# Patient Record
Sex: FEMALE | Race: WHITE
[De-identification: names, ages, dates, MRNs, and addresses within clinical notes are randomized per-mention and may not be internally consistent; named-entity substitution may affect disease eponyms.]

---

## 2018-04-25 ENCOUNTER — APPOINTMENT (OUTPATIENT)
Dept: WOUND CARE | Facility: CLINIC | Age: 83
End: 2018-04-25

## 2020-09-09 ENCOUNTER — APPOINTMENT (OUTPATIENT)
Dept: GERIATRICS | Facility: CLINIC | Age: 85
End: 2020-09-09
Payer: MEDICARE

## 2020-09-09 VITALS — HEART RATE: 72 BPM | OXYGEN SATURATION: 97 % | RESPIRATION RATE: 14 BRPM

## 2020-09-09 DIAGNOSIS — I73.9 PERIPHERAL VASCULAR DISEASE, UNSPECIFIED: ICD-10-CM

## 2020-09-09 DIAGNOSIS — G47.00 INSOMNIA, UNSPECIFIED: ICD-10-CM

## 2020-09-09 DIAGNOSIS — I50.9 HEART FAILURE, UNSPECIFIED: ICD-10-CM

## 2020-09-09 DIAGNOSIS — Z87.81 PERSONAL HISTORY OF (HEALED) TRAUMATIC FRACTURE: ICD-10-CM

## 2020-09-09 DIAGNOSIS — Z87.898 PERSONAL HISTORY OF OTHER SPECIFIED CONDITIONS: ICD-10-CM

## 2020-09-09 DIAGNOSIS — I10 ESSENTIAL (PRIMARY) HYPERTENSION: ICD-10-CM

## 2020-09-09 DIAGNOSIS — Z87.01 PERSONAL HISTORY OF PNEUMONIA (RECURRENT): ICD-10-CM

## 2020-09-09 DIAGNOSIS — R41.89 OTHER SYMPTOMS AND SIGNS INVOLVING COGNITIVE FUNCTIONS AND AWARENESS: ICD-10-CM

## 2020-09-09 DIAGNOSIS — Z82.3 FAMILY HISTORY OF STROKE: ICD-10-CM

## 2020-09-09 DIAGNOSIS — I48.0 PAROXYSMAL ATRIAL FIBRILLATION: ICD-10-CM

## 2020-09-09 PROCEDURE — 99205 OFFICE O/P NEW HI 60 MIN: CPT | Mod: 95

## 2020-09-09 RX ORDER — ZOLPIDEM TARTRATE 5 MG/1
5 TABLET ORAL
Refills: 0 | Status: ACTIVE | COMMUNITY

## 2020-09-09 RX ORDER — LOSARTAN POTASSIUM 100 MG/1
100 TABLET, FILM COATED ORAL
Refills: 0 | Status: ACTIVE | COMMUNITY

## 2020-09-09 RX ORDER — MULTIVIT-MIN/FA/LYCOPEN/LUTEIN .4-300-25
TABLET ORAL
Refills: 0 | Status: ACTIVE | COMMUNITY

## 2020-09-09 RX ORDER — APIXABAN 2.5 MG/1
2.5 TABLET, FILM COATED ORAL
Refills: 0 | Status: ACTIVE | COMMUNITY

## 2020-09-09 RX ORDER — NIFEDIPINE 30 MG/1
30 TABLET, EXTENDED RELEASE ORAL DAILY
Refills: 0 | Status: ACTIVE | COMMUNITY

## 2020-09-09 RX ORDER — ASPIRIN ENTERIC COATED TABLETS 81 MG 81 MG/1
81 TABLET, DELAYED RELEASE ORAL
Refills: 0 | Status: ACTIVE | COMMUNITY

## 2020-09-09 RX ORDER — SPIRONOLACTONE 25 MG/1
25 TABLET, FILM COATED ORAL DAILY
Qty: 90 | Refills: 1 | Status: ACTIVE | COMMUNITY

## 2020-09-09 NOTE — DATA REVIEWED
[FreeTextEntry1] : From info provided by radha (labs from [place] 5/2020):\par \par WBC 6.94, Hgb 11.8, Plt 201\par Na 134, K 4.4, BUN 33, Cr 1.55

## 2020-09-09 NOTE — HISTORY OF PRESENT ILLNESS
[Medical Office: ([facility])___] : at the medical office located in  [Home] : at home, [unfilled] , at the time of the visit. [Family Member] : family member [Verbal consent obtained from patient] : the patient, [unfilled] [0] : 2) Feeling down, depressed, or hopeless: Not at all [FreeTextEntry1] : 89 year old woman w/ PMH CHF, PAD s/p LLE stents (2019/2020), AFib (on eliquis), HTN, insomnia, gait abnormality and presbycusis presents for initial visit w/ cognitive decline w/ plan to establish care with new PCP.\par \par Prior PCP: Dr. Stanislaw Nguyễn\par \par Patient had some difficulty hearing me via televisit due to hearing aide dysfunction (daughters stated she had an appt w/ audiology later today to have them repaired). History was also provided by daughters.\par \par Patient was reportedly functionally independent in all ADLs and IADLs last year until a fall January 2020. 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 Records not available for review at today's visit, but given verbal hx by daughters. They report she had significant decline in functional and cognitive capability post-hospitalization and has not recovered to baseline. Was in [place] until last month (for past 7 months). She now lives in [facility] w/ 24/7 [facility]. She is dependent on all IADLs, relatively independent in ADLs. Currently ambulates w/ walker (previously independent or with cane). \par \par She reportedly wakes up in the middle of the night and will try to wander around the house. She is not agitated, but can be "resistant to taking meds" and "stubborn" at times. She often forgets things one hour after hearing them. \par \par CHF: Unclear ?systolic vs. diastolic. Currently on aldactone, losartan w/o lasix (?due to prior hyponatremia). Last K was 4.4 from 5/2020 in [place]. Also on nifedipine 30 mg qd. Family reports she is euvolemic, no hypoxia or dyspnea.\par \par AFib: Rate controlled. On eliquis.\par \par PAD: Prior stents in the last year. On ASA, no plavix (but eliquis). \par \par Insomnia: Family reports she has been on ambien for >20 years (5 mg qhs). More recent sleep/wake cycle disturbance in the past few months a/w cognitive decline. \par \par HCM: RECORDS NEED TO BE OBTAINED FROM PRIOR PCP, family is unsure\par DEXA:\par Audiology: has appt today\par Ophthalmology: \par Colonoscopy: n/a\par \par Immunizations:\par Pneumovax:\par Tdap:\par Flu:\par Shingrix:

## 2020-09-09 NOTE — CURRENT MEDS
[Reports Changes In Medication (including OTC)] : Patient reports changes in medication [High Risk Medications Reviewed] : High risk medications reviewed [Medication Reconciliation Completed] : Medication reconciliation completed [Patient/caregiver able to verbalize understanding of medications, including indications and side effects] : Patient/caregiver able to verbalize understanding of medications, including indications and side effects [] : does not miss any medication doses

## 2020-09-09 NOTE — REVIEW OF SYSTEMS
[Loss Of Hearing] : hearing loss [Confused] : confusion [Sleep Disturbances] : sleep disturbances [As Noted in HPI] : as noted in HPI [Negative] : Heme/Lymph [Dizziness] : no dizziness [Fainting] : no fainting

## 2020-09-09 NOTE — ASSESSMENT
[Living arrangements] : living arrangements [Social support] : social support [Medication Management] : medication management [Discussed at today's visit] : Advance Directives Discussed at today's visit [FreeTextEntry1] : Needs in-person eval ASAP in the office, next 1-2 weeks. Ordered labs. Need old records.  [FreeTextEntry4] : ?Federica and Ivett are co-HCP. Unclear if previously completed. There are 4 total children (one in [facility], one in [place], two in ).

## 2020-09-09 NOTE — PHYSICAL EXAM
[General Appearance - Alert] : alert [General Appearance - Well Nourished] : well nourished [General Appearance - In No Acute Distress] : in no acute distress [General Appearance - Well-Appearing] : healthy appearing [General Appearance - Well Developed] : well developed [] : no respiratory distress [Respiration, Rhythm And Depth] : normal respiratory rhythm and effort [Edema] : there was no peripheral edema [FreeTextEntry1] : difficulty hearing

## 2020-09-09 NOTE — SOCIAL HISTORY
[Any fall with injury in past year] : Patient reported fall with injury in the past year [Independent] : feeding [Some assistance needed] : transferring [Full assistance needed] : managing finances [Walker] : walker [FreeTextEntry2] : 24/7 [facility] [FreeTextEntry1] : SHAUN (Wendy Whitehead)  [FreeTextEntry4] : TBD

## 2021-10-06 PROBLEM — I10 ESSENTIAL HYPERTENSION: Status: ACTIVE | Noted: 2020-09-09

## 2024-06-16 ENCOUNTER — NON-APPOINTMENT (OUTPATIENT)
Age: 89
End: 2024-06-16

## 2024-06-18 ENCOUNTER — LABORATORY RESULT (OUTPATIENT)
Age: 89
End: 2024-06-18

## 2024-06-18 ENCOUNTER — APPOINTMENT (OUTPATIENT)
Dept: DERMATOLOGY | Facility: CLINIC | Age: 89
End: 2024-06-18
Payer: MEDICARE

## 2024-06-18 DIAGNOSIS — R23.8 OTHER SKIN CHANGES: ICD-10-CM

## 2024-06-18 DIAGNOSIS — D48.5 NEOPLASM OF UNCERTAIN BEHAVIOR OF SKIN: ICD-10-CM

## 2024-06-18 DIAGNOSIS — L57.0 ACTINIC KERATOSIS: ICD-10-CM

## 2024-06-18 DIAGNOSIS — L82.0 INFLAMED SEBORRHEIC KERATOSIS: ICD-10-CM

## 2024-06-18 PROCEDURE — 17110 DESTRUCTION B9 LES UP TO 14: CPT

## 2024-06-18 PROCEDURE — 11102 TANGNTL BX SKIN SINGLE LES: CPT | Mod: 59

## 2024-06-18 PROCEDURE — 17000 DESTRUCT PREMALG LESION: CPT | Mod: 59

## 2024-06-19 PROBLEM — L82.0 INFLAMED SEBORRHEIC KERATOSIS: Status: ACTIVE | Noted: 2024-06-19

## 2024-06-19 PROBLEM — D48.5 NEOPLASM OF UNCERTAIN BEHAVIOR OF SKIN: Status: ACTIVE | Noted: 2024-06-19

## 2024-06-19 PROBLEM — L57.0 ACTINIC KERATOSIS: Status: ACTIVE | Noted: 2024-06-19

## 2024-06-19 PROBLEM — R23.8 PAPULE OF SKIN: Status: ACTIVE | Noted: 2024-06-19

## 2024-06-20 NOTE — HISTORY OF PRESENT ILLNESS
[FreeTextEntry1] : np growth [de-identified] : Referred by: Dr. Stanislaw Wu   Ms. KAI JAY  is a 89 year old F here for evaluation of below  #growth on scalp x unknown duration, thickening #growth on L temple that she manipulates #growth on L side of nose x 2 yrs, stable  Personal hx of skin cancer: R chin 20 yrs ago ? skin cancer , resolved w/ cream Social Hx: Here with her daughter who lives in [facility] , and her aide

## 2024-06-20 NOTE — ASSESSMENT
[FreeTextEntry1] : #NUBS, vertex r/o HAK vs NMSC Biopsy by Shave The risks/benefits/alternatives of skin biopsy were explained to the patient, which include and are not limited to bleeding, infection, scarring or discoloration of skin, and recurrence of lesion. Patient expressed understanding of these risks and provided consent to the procedure. Time out with verification of patient and lesion site was performed. Site was prepped with rubbing alcohol, lidocaine with epinephrine was injected for anesthesia, and biopsy was performed. Specimen sent to path. Procedure was without complication and well tolerated. Wound care was discussed.  HAK vertex scalp We have discussed the nature and usual course of these lesions. After obtaining verbal consent, 1 lesions were treated with liquid nitrogen for several seconds, with a ~10 second thaw interval for a total of 2 cycles. The patient was informed of the potential for erythema, blister formation, hypo- or hyperpigmentation, and scar at the site of treatment. wound care discussed - vaseline for any irritation/soreness/blistering. The patient tolerated the treatment well and knows to return if the lesions do not resolve.  iSK, L forehead We have discussed the nature and usual course of these lesions. After obtaining verbal consent, 1 lesions were treated with liquid nitrogen for several seconds, with a ~10 second thaw interval for a total of 2 cycles. The patient was informed of the potential for erythema, blister formation, hypo- or hyperpigmentation, and scar at the site of treatment. wound care discussed - vaseline for any irritation/soreness/blistering. The patient tolerated the treatment well and knows to return if the lesions do not resolve.  papule of skin, , L nasal alar rim ddx angiofibroma vs nf vs unlikely idn  reportedly stable over time cont to monitor advised daughter/aide to RTC if any growth/change   call w/ results jamaica (daughter) [phone] som (daughter) [phone]

## 2024-06-20 NOTE — PHYSICAL EXAM
[Alert] : alert [Oriented x 3] : ~L oriented x 3 [Well Nourished] : well nourished [Conjunctiva Non-injected] : conjunctiva non-injected [No Visual Lymphadenopathy] : no visual  lymphadenopathy [No Clubbing] : no clubbing [No Edema] : no edema [No Bromhidrosis] : no bromhidrosis [No Chromhidrosis] : no chromhidrosis [FreeTextEntry3] : 0.5 cm pink plaque with a hypkeratotic horn on L vertex adjacent hyperkeratotic pink plaque on scalp vertex L forehead with verrucous papule smooth skin colored papule on L nasal ala

## 2024-06-20 NOTE — CONSULT LETTER
[Dear  ___] : Dear  [unfilled], [Consult Letter:] : I had the pleasure of evaluating your patient, [unfilled]. [Please see my note below.] : Please see my note below. [Consult Closing:] : Thank you very much for allowing me to participate in the care of this patient.  If you have any questions, please do not hesitate to contact me. [Sincerely,] : Sincerely, [FreeTextEntry3] : Amparo Mosquera MD Department of Dermatology [place] and [facility] at [facility]

## 2024-07-02 ENCOUNTER — NON-APPOINTMENT (OUTPATIENT)
Age: 89
End: 2024-07-02

## 2024-08-06 ENCOUNTER — LABORATORY RESULT (OUTPATIENT)
Age: 89
End: 2024-08-06

## 2024-08-07 ENCOUNTER — APPOINTMENT (OUTPATIENT)
Dept: DERMATOLOGY | Facility: CLINIC | Age: 89
End: 2024-08-07

## 2024-08-07 PROBLEM — D04.4 SQUAMOUS CELL CARCINOMA IN SITU (SCCIS) OF SCALP: Status: ACTIVE | Noted: 2024-08-07

## 2024-08-07 PROBLEM — D48.5: Status: ACTIVE | Noted: 2024-08-07

## 2024-08-07 PROCEDURE — 17270 DSTR MAL LES S/N/H/F/G .5 /<: CPT

## 2024-08-07 PROCEDURE — 17003 DESTRUCT PREMALG LES 2-14: CPT

## 2024-08-07 PROCEDURE — 11105 PUNCH BX SKIN EA SEP/ADDL: CPT

## 2024-08-07 PROCEDURE — 11104 PUNCH BX SKIN SINGLE LESION: CPT | Mod: 59

## 2024-08-07 PROCEDURE — 17000 DESTRUCT PREMALG LESION: CPT | Mod: 59

## 2024-08-07 NOTE — PHYSICAL EXAM
[Alert] : alert [Oriented x 3] : ~L oriented x 3 [Well Nourished] : well nourished [Conjunctiva Non-injected] : conjunctiva non-injected [No Visual Lymphadenopathy] : no visual  lymphadenopathy [No Clubbing] : no clubbing [No Edema] : no edema [No Bromhidrosis] : no bromhidrosis [No Chromhidrosis] : no chromhidrosis [FreeTextEntry3] : 0.5 cm pink thin hyperkeratotic patch on L vertex b/l medial lower legs with hyperkeratotic papules smooth skin colored papule on L nasal ala, stable compared to prior exam R hip with crusted ulceration and surrounding erythema

## 2024-08-07 NOTE — HISTORY OF PRESENT ILLNESS
[FreeTextEntry1] : fp growth [de-identified] : Referred by: Dr. Stanislaw Wu   Ms. 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 pt's family deferred [facility] consultation bc its very hard for pt to travel. here to f/u hx: present x unknown duration, thickening #growths on legs x months #wound on R hip - noted as a dark purple claudia 1 month ago , unknown duration. patient sleeps on that side x many years. #growth on L side of nose x 2 yrs, stable  Personal hx of skin cancer: R chin 20 yrs ago ? skin cancer , resolved w/ cream Social Hx: Here with her daughter Federica and her aide

## 2024-08-07 NOTE — CONSULT LETTER
[Dear  ___] : Dear  [unfilled], [Consult Letter:] : I had the pleasure of evaluating your patient, [unfilled]. [Please see my note below.] : Please see my note below. [Consult Closing:] : Thank you very much for allowing me to participate in the care of this patient.  If you have any questions, please do not hesitate to contact me. [Sincerely,] : Sincerely, [FreeTextEntry3] : Amparo Mosquera MD Department of Dermatology [place] and [facility] at [facility]

## 2024-08-07 NOTE — ASSESSMENT
[FreeTextEntry1] : #sccis vs can't r/o scc, vertex scalp Therapeutic options and their risks and benefits; along with multiple diagnostic possibilities were discussed at length; risks and benefits of further study were discussed pt's daughter opts for trial LN2 today. if lesion still present at f/u will do shave biopsy RTC 1 mo  After obtaining verbal consent, 1 lesions were treated with liquid nitrogen for several seconds, with a ~10 second thaw interval for a total of 2 cycles. The patient was informed of the potential for erythema, blister formation, hypo- or hyperpigmentation, and scar at the site of treatment. wound care discussed - vaseline for any irritation/soreness/blistering. The patient tolerated the treatment well and knows to return if the lesions do not resolve.  HAK b/l medial lower legs x 2 We have discussed the nature and usual course of these lesions. After obtaining verbal consent, 2 lesions were treated with liquid nitrogen for several seconds, with a ~10 second thaw interval for a total of 2 cycles. The patient was informed of the potential for erythema, blister formation, hypo- or hyperpigmentation, and scar at the site of treatment. wound care discussed - vaseline for any irritation/soreness/blistering. The patient tolerated the treatment well and knows to return if the lesions do not resolve.  ulcer, R hip favor pressure ulcer, however given relatively rapid progression over the past month would recommend biopsy and tissue culture to r/o infectious vs inflammatory vs malignant etiology family and pt amenable NUBS Biopsy by  Punch x 2 for tissue cultures and H&E The risks/benefits/alternatives of skin biopsy were explained to the patient, which include and are not limited to bleeding, infection, scarring or discoloration of skin, and recurrence of lesion. Patient expressed understanding of these risks and provided consent to the procedure. Time out with verification of patient and lesion site was performed. Site was prepped with rubbing alcohol, lidocaine with epinephrine was injected for anesthesia, and biopsy was performed w/ 4 mm punch. Hemostasis with gelfoam. Specimen sent to path. Procedure was without complication and well tolerated. Wound care was discussed - vaseline and pressure offloading bandages.  papule of skin, , L nasal alar rim. stable ddx angiofibroma vs nf vs idn  advised daughter/aide to RTC if any growth/change   call w/ results som (daughter) [phone] jamaica (daughter) [phone]

## 2024-08-13 ENCOUNTER — NON-APPOINTMENT (OUTPATIENT)
Age: 89
End: 2024-08-13

## 2024-08-13 DIAGNOSIS — A49.01 METHICILLIN SUSCEPTIBLE STAPHYLOCOCCUS AUREUS INFECTION, UNSPECIFIED SITE: ICD-10-CM

## 2024-08-15 PROBLEM — A49.01 METHICILLIN SUSCEPTIBLE STAPHYLOCOCCUS AUREUS INFECTION: Status: ACTIVE | Noted: 2024-08-15

## 2024-08-15 RX ORDER — DOXYCYCLINE HYCLATE 100 MG/1
100 CAPSULE ORAL TWICE DAILY
Qty: 28 | Refills: 0 | Status: ACTIVE | COMMUNITY
Start: 2024-08-15 | End: 1900-01-01

## 2024-08-15 RX ORDER — MUPIROCIN 20 MG/G
2 OINTMENT TOPICAL
Qty: 2 | Refills: 2 | Status: ACTIVE | COMMUNITY
Start: 2024-08-15 | End: 1900-01-01

## 2024-08-22 ENCOUNTER — NON-APPOINTMENT (OUTPATIENT)
Age: 89
End: 2024-08-22

## 2024-08-22 DIAGNOSIS — L30.9 DERMATITIS, UNSPECIFIED: ICD-10-CM

## 2024-08-29 DIAGNOSIS — T14.8XXA OTHER INJURY OF UNSPECIFIED BODY REGION, INITIAL ENCOUNTER: ICD-10-CM

## 2024-08-29 PROBLEM — L30.9 DERMATITIS: Status: ACTIVE | Noted: 2024-08-29

## 2024-08-29 RX ORDER — TACROLIMUS 1 MG/G
0.1 OINTMENT TOPICAL
Qty: 1 | Refills: 3 | Status: ACTIVE | COMMUNITY
Start: 2024-08-29 | End: 1900-01-01

## 2024-09-11 ENCOUNTER — APPOINTMENT (OUTPATIENT)
Dept: DERMATOLOGY | Facility: CLINIC | Age: 89
End: 2024-09-11

## 2024-09-26 ENCOUNTER — NON-APPOINTMENT (OUTPATIENT)
Age: 89
End: 2024-09-26